# Patient Record
Sex: MALE | Race: WHITE | NOT HISPANIC OR LATINO | URBAN - METROPOLITAN AREA
[De-identification: names, ages, dates, MRNs, and addresses within clinical notes are randomized per-mention and may not be internally consistent; named-entity substitution may affect disease eponyms.]

---

## 2019-01-16 ENCOUNTER — HISTORICAL (OUTPATIENT)
Dept: ADMINISTRATIVE | Facility: HOSPITAL | Age: 69
End: 2019-01-16

## 2019-01-16 LAB
ALBUMIN SERPL-MCNC: 4.1 GM/DL (ref 3.4–5)
ALP SERPL-CCNC: 85 UNIT/L (ref 50–136)
ALT SERPL-CCNC: 24 UNIT/L (ref 12–78)
AST SERPL-CCNC: 15 UNIT/L (ref 15–37)
BILIRUB SERPL-MCNC: 0.4 MG/DL (ref 0.2–1)
BILIRUBIN DIRECT+TOT PNL SERPL-MCNC: 0.1 MG/DL (ref 0–0.5)
BILIRUBIN DIRECT+TOT PNL SERPL-MCNC: 0.3 MG/DL (ref 0–0.8)
CHOLEST SERPL-MCNC: 202 MG/DL (ref 0–200)
CHOLEST/HDLC SERPL: 4 {RATIO} (ref 0–5)
HDLC SERPL-MCNC: 51 MG/DL (ref 35–60)
LDLC SERPL CALC-MCNC: 136 MG/DL (ref 0–129)
LIVER PROFILE INTERP: NORMAL
PROT SERPL-MCNC: 6.9 GM/DL (ref 6.4–8.2)
PSA SERPL-MCNC: 0.9 NG/ML (ref 0–4)
TRIGL SERPL-MCNC: 76 MG/DL (ref 30–150)
VLDLC SERPL CALC-MCNC: 15 MG/DL

## 2019-04-29 ENCOUNTER — HISTORICAL (OUTPATIENT)
Dept: ADMINISTRATIVE | Facility: HOSPITAL | Age: 69
End: 2019-04-29

## 2019-05-09 ENCOUNTER — HISTORICAL (OUTPATIENT)
Dept: ADMINISTRATIVE | Facility: HOSPITAL | Age: 69
End: 2019-05-09

## 2019-05-09 ENCOUNTER — HISTORICAL (OUTPATIENT)
Dept: LAB | Facility: HOSPITAL | Age: 69
End: 2019-05-09

## 2019-05-10 LAB — GRAM STN SPEC: NORMAL

## 2019-05-12 LAB — FINAL CULTURE: NO GROWTH

## 2019-07-02 ENCOUNTER — HISTORICAL (OUTPATIENT)
Dept: LAB | Facility: HOSPITAL | Age: 69
End: 2019-07-02

## 2019-07-03 LAB — GRAM STN SPEC: NORMAL

## 2019-07-04 LAB — FINAL CULTURE: NORMAL

## 2019-09-24 ENCOUNTER — HISTORICAL (OUTPATIENT)
Dept: ADMINISTRATIVE | Facility: HOSPITAL | Age: 69
End: 2019-09-24

## 2019-09-24 LAB
ALBUMIN SERPL-MCNC: 4.1 GM/DL (ref 3.4–5)
ALP SERPL-CCNC: 72 UNIT/L (ref 50–136)
ALT SERPL-CCNC: 36 UNIT/L (ref 12–78)
AST SERPL-CCNC: 21 UNIT/L (ref 15–37)
BILIRUB SERPL-MCNC: 0.6 MG/DL (ref 0.2–1)
BILIRUBIN DIRECT+TOT PNL SERPL-MCNC: 0.1 MG/DL (ref 0–0.5)
BILIRUBIN DIRECT+TOT PNL SERPL-MCNC: 0.5 MG/DL (ref 0–0.8)
CHOLEST SERPL-MCNC: 210 MG/DL (ref 0–200)
CHOLEST/HDLC SERPL: 3.9 {RATIO} (ref 0–5)
HDLC SERPL-MCNC: 54 MG/DL (ref 35–60)
LDLC SERPL CALC-MCNC: 141 MG/DL (ref 0–129)
LIVER PROFILE INTERP: NORMAL
PROT SERPL-MCNC: 6.6 GM/DL (ref 6.4–8.2)
TRIGL SERPL-MCNC: 73 MG/DL (ref 30–150)
VLDLC SERPL CALC-MCNC: 15 MG/DL

## 2019-09-26 ENCOUNTER — HISTORICAL (OUTPATIENT)
Dept: ADMINISTRATIVE | Facility: HOSPITAL | Age: 69
End: 2019-09-26

## 2019-09-26 LAB
ABS NEUT (OLG): 4.81 X10(3)/MCL (ref 2.1–9.2)
BASOPHILS # BLD AUTO: 0 X10(3)/MCL (ref 0–0.2)
BASOPHILS NFR BLD AUTO: 0 %
EOSINOPHIL # BLD AUTO: 0 X10(3)/MCL (ref 0–0.9)
EOSINOPHIL NFR BLD AUTO: 0 %
ERYTHROCYTE [DISTWIDTH] IN BLOOD BY AUTOMATED COUNT: 12 % (ref 11.5–17)
HCT VFR BLD AUTO: 47.4 % (ref 42–52)
HGB BLD-MCNC: 15.5 GM/DL (ref 14–18)
LYMPHOCYTES # BLD AUTO: 0.6 X10(3)/MCL (ref 0.6–4.6)
LYMPHOCYTES NFR BLD AUTO: 11 %
MCH RBC QN AUTO: 28.3 PG (ref 27–31)
MCHC RBC AUTO-ENTMCNC: 32.7 GM/DL (ref 33–36)
MCV RBC AUTO: 86.7 FL (ref 80–94)
MONOCYTES # BLD AUTO: 0.1 X10(3)/MCL (ref 0.1–1.3)
MONOCYTES NFR BLD AUTO: 1 %
NEUTROPHILS # BLD AUTO: 4.81 X10(3)/MCL (ref 2.1–9.2)
NEUTROPHILS NFR BLD AUTO: 87 %
PLATELET # BLD AUTO: 186 X10(3)/MCL (ref 130–400)
PMV BLD AUTO: 10.2 FL (ref 9.4–12.4)
RBC # BLD AUTO: 5.47 X10(6)/MCL (ref 4.7–6.1)
WBC # SPEC AUTO: 5.5 X10(3)/MCL (ref 4.5–11.5)

## 2019-10-09 ENCOUNTER — HISTORICAL (OUTPATIENT)
Dept: LAB | Facility: HOSPITAL | Age: 69
End: 2019-10-09

## 2019-10-09 ENCOUNTER — HISTORICAL (OUTPATIENT)
Dept: ADMINISTRATIVE | Facility: HOSPITAL | Age: 69
End: 2019-10-09

## 2019-10-09 LAB
ABS NEUT (OLG): 4.8 X10(3)/MCL (ref 2.1–9.2)
ALBUMIN SERPL-MCNC: 4.2 GM/DL (ref 3.4–5)
ALBUMIN/GLOB SERPL: 1.91 {RATIO} (ref 1.5–2.5)
ALP SERPL-CCNC: 60 UNIT/L (ref 38–126)
ALT SERPL-CCNC: 16 UNIT/L (ref 7–52)
APPEARANCE, UA: CLEAR
AST SERPL-CCNC: 16 UNIT/L (ref 15–37)
BACTERIA #/AREA URNS AUTO: NORMAL /HPF
BILIRUB SERPL-MCNC: 0.4 MG/DL (ref 0.2–1)
BILIRUB UR QL STRIP: NEGATIVE MG/DL
BILIRUBIN DIRECT+TOT PNL SERPL-MCNC: 0.1 MG/DL (ref 0–0.5)
BILIRUBIN DIRECT+TOT PNL SERPL-MCNC: 0.3 MG/DL
BUN SERPL-MCNC: 19 MG/DL (ref 7–18)
CALCIUM SERPL-MCNC: 9 MG/DL (ref 8.5–10)
CHLORIDE SERPL-SCNC: 108 MMOL/L (ref 98–107)
CHOLEST SERPL-MCNC: 230 MG/DL (ref 0–200)
CHOLEST/HDLC SERPL: 5.1 {RATIO}
CO2 SERPL-SCNC: 27 MMOL/L (ref 21–32)
COLOR UR: YELLOW
CREAT SERPL-MCNC: 1.09 MG/DL (ref 0.6–1.3)
ERYTHROCYTE [DISTWIDTH] IN BLOOD BY AUTOMATED COUNT: 12 % (ref 11.5–17)
EST. AVERAGE GLUCOSE BLD GHB EST-MCNC: 100 MG/DL
GLOBULIN SER-MCNC: 2.2 GM/DL (ref 1.2–3)
GLUCOSE (UA): NEGATIVE MG/DL
GLUCOSE SERPL-MCNC: 118 MG/DL (ref 74–106)
HBA1C MFR BLD: 5.1 % (ref 4.4–6.4)
HCT VFR BLD AUTO: 43.7 % (ref 42–52)
HCV AB SERPL QL IA: NEGATIVE
HDLC SERPL-MCNC: 45 MG/DL (ref 35–60)
HGB BLD-MCNC: 15.1 GM/DL (ref 14–18)
HGB UR QL STRIP: NEGATIVE UNIT/L
KETONES UR QL STRIP: NEGATIVE MG/DL
LDLC SERPL CALC-MCNC: 161 MG/DL (ref 0–129)
LEUKOCYTE ESTERASE UR QL STRIP: NEGATIVE UNIT/L
LYMPHOCYTES # BLD AUTO: 2 X10(3)/MCL (ref 0.6–3.4)
LYMPHOCYTES NFR BLD AUTO: 26.9 % (ref 13–40)
MCH RBC QN AUTO: 29.2 PG (ref 27–31.2)
MCHC RBC AUTO-ENTMCNC: 35 GM/DL (ref 32–36)
MCV RBC AUTO: 84 FL (ref 80–94)
MONOCYTES # BLD AUTO: 0.5 X10(3)/MCL (ref 0.1–1.3)
MONOCYTES NFR BLD AUTO: 6.6 % (ref 0.1–24)
NEUTROPHILS NFR BLD AUTO: 66.5 % (ref 47–80)
NITRITE UR QL STRIP.AUTO: NEGATIVE
PH UR STRIP: 5.5 [PH]
PLATELET # BLD AUTO: 150 X10(3)/MCL (ref 130–400)
PMV BLD AUTO: 9.7 FL (ref 9.4–12.4)
POTASSIUM SERPL-SCNC: 4.3 MMOL/L (ref 3.5–5.1)
PROT SERPL-MCNC: 6.4 GM/DL (ref 6.4–8.2)
PROT UR QL STRIP: NEGATIVE MG/DL
PSA SERPL-MCNC: 0.92 NG/ML (ref 0–4.5)
RBC # BLD AUTO: 5.18 X10(6)/MCL (ref 4.7–6.1)
RBC #/AREA URNS HPF: NORMAL /HPF
SODIUM SERPL-SCNC: 141 MMOL/L (ref 136–145)
SP GR UR STRIP: 1.01
SQUAMOUS EPITHELIAL, UA: NORMAL /LPF
TRIGL SERPL-MCNC: 65 MG/DL (ref 30–150)
TSH SERPL-ACNC: 1.3 MIU/ML (ref 0.35–4.94)
UROBILINOGEN UR STRIP-ACNC: 0.2 MG/DL
VLDLC SERPL CALC-MCNC: 13 MG/DL
WBC # SPEC AUTO: 7.3 X10(3)/MCL (ref 4.5–11.5)
WBC #/AREA URNS AUTO: NORMAL /[HPF]

## 2019-11-15 ENCOUNTER — HISTORICAL (OUTPATIENT)
Dept: ADMINISTRATIVE | Facility: HOSPITAL | Age: 69
End: 2019-11-15

## 2020-10-20 ENCOUNTER — HISTORICAL (OUTPATIENT)
Dept: ADMINISTRATIVE | Facility: HOSPITAL | Age: 70
End: 2020-10-20

## 2020-10-20 LAB
CHOLEST SERPL-MCNC: 195 MG/DL
CHOLEST/HDLC SERPL: 4 {RATIO} (ref 0–5)
HDLC SERPL-MCNC: 46 MG/DL (ref 35–60)
LDLC SERPL CALC-MCNC: 134 MG/DL (ref 50–140)
TRIGL SERPL-MCNC: 76 MG/DL (ref 34–140)
VLDLC SERPL CALC-MCNC: 15 MG/DL

## 2021-02-17 ENCOUNTER — HISTORICAL (OUTPATIENT)
Dept: ADMINISTRATIVE | Facility: HOSPITAL | Age: 71
End: 2021-02-17

## 2021-05-05 ENCOUNTER — HISTORICAL (OUTPATIENT)
Dept: ADMINISTRATIVE | Facility: HOSPITAL | Age: 71
End: 2021-05-05

## 2021-05-05 LAB
CHOLEST SERPL-MCNC: 199 MG/DL
CHOLEST/HDLC SERPL: 5 {RATIO} (ref 0–5)
HDLC SERPL-MCNC: 40 MG/DL (ref 35–60)
LDLC SERPL CALC-MCNC: 136 MG/DL (ref 50–140)
TRIGL SERPL-MCNC: 114 MG/DL (ref 34–140)
VLDLC SERPL CALC-MCNC: 23 MG/DL

## 2021-12-27 PROBLEM — I05.9 MITRAL VALVE DISEASE: Status: ACTIVE | Noted: 2021-12-27

## 2021-12-27 PROBLEM — Z95.2 H/O AORTIC VALVE REPLACEMENT: Status: ACTIVE | Noted: 2021-12-27

## 2021-12-27 PROBLEM — U07.1 COVID-19: Status: ACTIVE | Noted: 2021-12-27

## 2021-12-28 ENCOUNTER — INFUSION (OUTPATIENT)
Dept: INFECTIOUS DISEASES | Facility: HOSPITAL | Age: 71
End: 2021-12-28
Payer: OTHER GOVERNMENT

## 2021-12-28 VITALS
TEMPERATURE: 98 F | WEIGHT: 170 LBS | SYSTOLIC BLOOD PRESSURE: 154 MMHG | OXYGEN SATURATION: 96 % | HEART RATE: 59 BPM | BODY MASS INDEX: 25.18 KG/M2 | DIASTOLIC BLOOD PRESSURE: 83 MMHG | HEIGHT: 69 IN | RESPIRATION RATE: 20 BRPM

## 2021-12-28 DIAGNOSIS — U07.1 COVID-19: Primary | ICD-10-CM

## 2021-12-28 PROCEDURE — 25000003 PHARM REV CODE 250: Mod: PN | Performed by: ANESTHESIOLOGY

## 2021-12-28 PROCEDURE — 63600175 PHARM REV CODE 636 W HCPCS: Mod: PN | Performed by: ANESTHESIOLOGY

## 2021-12-28 PROCEDURE — M0243 CASIRIVI AND IMDEVI INFUSION: HCPCS | Performed by: ANESTHESIOLOGY

## 2021-12-28 RX ORDER — ONDANSETRON 4 MG/1
4 TABLET, ORALLY DISINTEGRATING ORAL ONCE AS NEEDED
Status: DISCONTINUED | OUTPATIENT
Start: 2021-12-28 | End: 2024-01-08

## 2021-12-28 RX ORDER — METHYLPREDNISOLONE SOD SUCC 125 MG
40 VIAL (EA) INJECTION ONCE AS NEEDED
Status: DISCONTINUED | OUTPATIENT
Start: 2021-12-28 | End: 2022-12-20

## 2021-12-28 RX ORDER — SODIUM CHLORIDE 0.9 % (FLUSH) 0.9 %
10 SYRINGE (ML) INJECTION
Status: DISCONTINUED | OUTPATIENT
Start: 2021-12-28 | End: 2022-12-20

## 2021-12-28 RX ORDER — ALBUTEROL SULFATE 90 UG/1
2 AEROSOL, METERED RESPIRATORY (INHALATION)
Status: DISCONTINUED | OUTPATIENT
Start: 2021-12-28 | End: 2022-12-20

## 2021-12-28 RX ORDER — ACETAMINOPHEN 325 MG/1
650 TABLET ORAL ONCE AS NEEDED
Status: DISCONTINUED | OUTPATIENT
Start: 2021-12-28 | End: 2022-07-26

## 2021-12-28 RX ORDER — DIPHENHYDRAMINE HYDROCHLORIDE 50 MG/ML
25 INJECTION INTRAMUSCULAR; INTRAVENOUS ONCE AS NEEDED
Status: DISCONTINUED | OUTPATIENT
Start: 2021-12-28 | End: 2024-01-08

## 2021-12-28 RX ORDER — EPINEPHRINE 0.3 MG/.3ML
0.3 INJECTION SUBCUTANEOUS
Status: DISCONTINUED | OUTPATIENT
Start: 2021-12-28 | End: 2024-01-08

## 2021-12-28 RX ADMIN — SODIUM CHLORIDE: 0.9 INJECTION, SOLUTION INTRAVENOUS at 02:12

## 2021-12-28 RX ADMIN — Medication 600 MG: at 02:12

## 2022-04-10 ENCOUNTER — HISTORICAL (OUTPATIENT)
Dept: ADMINISTRATIVE | Facility: HOSPITAL | Age: 72
End: 2022-04-10

## 2022-04-25 VITALS
DIASTOLIC BLOOD PRESSURE: 85 MMHG | WEIGHT: 170.63 LBS | HEIGHT: 69 IN | OXYGEN SATURATION: 98 % | BODY MASS INDEX: 25.27 KG/M2 | SYSTOLIC BLOOD PRESSURE: 135 MMHG

## 2022-05-03 NOTE — HISTORICAL [facility] CERNER
This is a historical note converted from [employer]. Formatting and pictures may have been removed.  Please reference Rusty for original formatting and attached multimedia. Chief Complaint  wellness  History of Present Illness  The patient is a 69 year old white male here today for a complete Wellness physical.? The patient?has?no acute complaints today. The patient also carries a diagnosis of?aortic valve stenosis with history of repair/vasomotor rhinitis, which is assessed today.? Exercise is reported as moderate and includes?primarily work activities but does have?several times a week?full body workout with no chest pain or shortness of breath reported.?? Diet modifications are reported as?attempting lean proteins and increased vegetable matter. ?Decrease carbohydrates. ?Although he does report some?snacking on sweets?secondary to family dynamic.?? Previous documented weight is recorded? as charted?with noted good maintenance of weight with no?dramatic change.  He does follow closely with Dr. Chandler his cardiologist for?aortic valve stenosis status post repair. ?Recently he was found to have mitral valve regurgitation which Dr. Chandler is following closely. ?Asymptomatic.  He follows with Dr. Fede Jimenez for?alopecia/Propecia prescription.? He most recently was being seen for?sinusitis, bronchitis symptoms?for which he saw myself, Matthew Molina, and eventually?Dr. Ye?with allergy and immunology-allergy work-up was negative and he was diagnosed with?vasomotor ?rhinitis. ?He was placed on Dymista and Xyzal?and symptoms have been?improving  ?   Dr. Chandler-cardiology  Dr. Fede Jimenez-alopecia/Propecia prescription  Dr.?Frankie Stahl-dermatology  Review of Systems  Constitutional:?no weight gain,?no weight loss,?no fatigue,?no fever,?no chills,?no weakness,?no trouble sleeping.  Eyes:?no vision loss/changes,?no glasses or contacts,?no pain,?no redness,?no blurry or double vision,?no flashing lights,?no  specks,?no glaucoma,?no cataracts.  Last eye exam:?within last 6 months  Head:?no headache,?no head injury,?no neck pain.?  Neck:??no lumps,?no swollen glands,?no stiffness.  Ears:?no decreased hearing,?no ringing,?no earache,?no drainage.?  Nose:?no stuffiness,?no discharge,?no itching,?no hay fever,?no nosebleeds,?no sinus pain.  Throat:?no bleeding,?no dentures,?no sore tongue,?no dry mouth,?no sore throat,?no hoarseness,?no thrush,?no non-healing sores.  Cardiovascular:?no chest pain or discomfort,?no tightness,?no palpitations,?no SOB with activity,?no difficulty breathing while supine,?no swelling,?no sudden awakening from sleep with SOB.  Vascular:?no calf pain with walking,?no leg cramping.  Respiratory:??no cough,?no sputum,?no coughing up blood,?no SOB,?no wheezing,?no painful breathing.  Gastrointestinal:?no swallowing difficulty,?no heartburn,?no change in appetite,?no nausea,?no change in bowel habits,?no rectal bleeding,?no constipation,?no diarrhea,?no yellow eyes or skin.  Urinary:?no frequency,?no urgency,?no burning or pain,?no blood in urine,?no incontinence,?no change in urinary strength.  Musculoskeletal:?no muscle or joint pain,?no stiffness,?no back pain,?no redness of joints,?no swelling of joints,?no trauma.  Skin:?no rashes,?no lumps,?no itching,?no dryness,?color normal for ethnicity,?no hair or nail changes.  Neurologic:?no dizziness,?no fainting,?no seizures,?no weakness,?no numbness,?no tingling,?no tremors.  Psychiatric:?no nervousness,?no stress,?no depression,?no memory loss.  Endocrine:?no heat or cold intolerance,?no sweating,?no frequent urination,?no thirst,?no change in appetite.  Hematologic:?no ease of bruising,?no ease of bleeding.  ?  ?  ?  ?  Physical Exam  Vitals & Measurements  BP:?144/76?  HT:?175?cm? WT:?78.8?kg? BMI:?25.73?  VITAL SIGNS:? Reviewed.? ?  GENERAL:? In?no apparent distress.? Alert and Oriented x3  HEAD:?No signsof head trauma. Normocephalic  EYES:?  Pupils?equal/round/reactive.? Extraocular motionsintact.  EARS:? Hearing?grossly intact. TMs and EAC?clear  MOUTH:??Oropharynx is clear.?No erythema. No exudates  NECK:? No LAD. No JVD. No thyromegaly. No bruits  CHEST:? Chest with clear breath sounds bilaterally.? No wheezes, rales, or rhonchi. Good air movement  CARDIAC:? Regular rate and rhythm.? S1 and S2, noted?3 out of 6?murmur heard near mitral valve, gallops, or rubs.  VASCULAR:??No Edema.? Peripheral pulses normal and equal in all extremities.  ABDOMEN:?Soft, without detectable tenderness.??No sign of distention.?No rebound or guarding, and no masses palpated.? ?Bowel Sounds present and normal x 4.  MUSCULOSKELETAL:??Good range of motion of all major joints.?5/5 strength throughout. Extremities without clubbing, cyanosis or edema.  NEUROLOGIC EXAM:? Alert and oriented x 3.? No focal sensory or strength deficits.? ?Speech normal.? Follows commands.  PSYCHIATRIC:? Mood normal.  SKIN:??No rash or lesions.  Assessment/Plan  1.?Wellness examination?Z00.00  ?Assessment/Plan:  ?   1.?Wellness  ?   -Health and Exercise Prescription issued and educated upon  ?   -10% weight loss goal  ?   -Lifestyle counseling >20minutes  ?   -Diet: Lean proteins and increased vegetable matter, low-sodium, cardiac  ?   -Screening: Colonoscopy deferred by patient; after discussion he agrees to Cologuard-we will fill out paperwork and he will perform.? Prostate screening-she defers FRANNIE today-risk and benefits discussed and the patient voiced understanding of guidelines-PSA drawn today?and found to be normal,?nonchanging?relative to last.  ?   -Vaccines: The patient will perform Shingrix No. 1,?Td vaccine,?HD flu vaccine upon his return?from hunting in 2 weeks. ?Otherwise up-to-date  ?   -Labs:?See below  ?   2.Comorbidities:?See below  ?  3. Referrals:?None  ?  4. RTC: 12-month wellness  ?  Ordered:  Clinic Follow up, *Est. 10/14/20 8:45:00 CDT, Order for future visit, Wellness  examination, [facility]  Preventative Health Care Est 65 yrs and over [id number] PC, Wellness examination, [facility], 10/09/19 15:35:00 CDT  ?  2.?History of aortic valve stenosis?Z86.79  ?-Follow-up with Dr. Chandler accordingly as scheduled or sooner if clinically indicated  -Asymptomatic today  ?  -Defers vaccinations today but will return in 2 weeks for these  Referrals  Clinic Follow up, *Est. 10/14/20 8:45:00 CDT, Order for future visit, Wellness examination, [facility]   Problem List/Past Medical History  Ongoing  History of aortic valve stenosis  Historical  No qualifying data  Medications  aspirin 81 mg oral tablet, CHEWABLE, Daily  Avapro 150 mg oral tablet, Daily  finasteride 1 mg oral tablet, Daily  Multi Vitamin+  Allergies  No Known Allergies  Social History  Abuse/Neglect  No, 10/09/2019  Tobacco  Never (less than 100 in lifetime), N/A, 10/09/2019  Health Maintenance  Health Maintenance  ???Pending?(in the next year)  ??? ??OverDue  ??? ? ? ?Advance Directive due??01/01/19??and every 1??year(s)  ??? ? ? ?Cognitive Screening due??01/01/19??and every 1??year(s)  ??? ? ? ?Fall Risk Assessment due??01/01/19??and every 1??year(s)  ??? ? ? ?Functional Assessment due??01/01/19??and every 1??year(s)  ??? ? ? ?Geriatric Depression Screening due??01/01/19??and every 1??year(s)  ??? ??Due?  ??? ? ? ?ADL Screening due??10/10/19??and every 1??year(s)  ??? ? ? ?Colorectal Screening (Sen[facility] Wellness) due??10/10/19??and every?  ??? ? ? ?Pneumococcal Vaccine due??10/10/19??Variable frequency  ??? ? ? ?Pneumococcal Vaccine due??10/10/19??and every?  ??? ? ? ?Tetanus Vaccine due??10/10/19??and every 10??year(s)  ??? ? ? ?Zoster Vaccine due??10/10/19??and every 100??year(s)  ??? ??Due In Future?  ??? ? ? ?Alcohol Misuse Screening not due until??01/01/20??and every 1??year(s)  ??? ? ? ?Obesity Screening not due until??01/01/20??and every 1??year(s)  ???Satisfied?(in the past 1 year)  ??? ??Satisfied?  ??? ? ? ?Alcohol Misuse  Screening on??10/10/19.??Satisfied by Gulshan Hollis MD  ??? ? ? ?Aspirin Therapy for CVD Prevention on??10/10/19.??Satisfied by Gulshan Hollis MD  ??? ? ? ?Blood Pressure Screening on??10/09/19.??Satisfied by Emily Knutson MA  ??? ? ? ?Body Mass Index Check on??10/09/19.??Satisfied by Emily Knutson MA  ??? ? ? ?Diabetes Screening on??10/09/19.??Satisfied by Antonia Chaparro  ??? ? ? ?Influenza Vaccine on??10/09/19.??Satisfied by Emily Knutson MA  ??? ? ? ?Lipid Screening on??10/09/19.??Satisfied by Elissa Cornejo  ??? ? ? ?Obesity Screening on??10/09/19.??Satisfied by Emily Knutson MA  ?      Addendum:?Note was produced on 10-10-19??Actual office visit was performed on 10-9-2019 for wellness visit

## 2022-06-01 PROBLEM — M79.675 GREAT TOE PAIN, LEFT: Status: ACTIVE | Noted: 2022-06-01

## 2022-06-01 PROBLEM — M10.9 GOUT: Status: ACTIVE | Noted: 2022-06-01

## 2022-06-01 PROBLEM — Z86.79 H/O HEART DISORDER: Status: ACTIVE | Noted: 2022-06-01

## 2022-06-01 PROBLEM — E04.1 THYROID NODULE: Status: ACTIVE | Noted: 2022-06-01

## 2022-06-01 PROBLEM — E78.5 HYPERLIPIDEMIA: Status: ACTIVE | Noted: 2022-06-01

## 2022-12-20 PROBLEM — M54.16 LUMBAR BACK PAIN WITH RADICULOPATHY AFFECTING LOWER EXTREMITY: Status: ACTIVE | Noted: 2022-12-20

## 2022-12-20 PROBLEM — M51.26 LUMBAR DISC HERNIATION: Status: ACTIVE | Noted: 2022-12-20

## 2024-01-08 PROBLEM — U07.1 COVID-19: Status: RESOLVED | Noted: 2021-12-27 | Resolved: 2024-01-08

## 2024-01-08 PROBLEM — L64.9 MALE PATTERN ALOPECIA: Status: ACTIVE | Noted: 2024-01-08

## 2024-01-08 PROBLEM — E78.2 MIXED HYPERLIPIDEMIA: Status: ACTIVE | Noted: 2022-06-01

## 2024-01-08 PROBLEM — N52.03 COMBINED ARTERIAL INSUFFICIENCY AND CORPORO-VENOUS OCCLUSIVE ERECTILE DYSFUNCTION: Status: ACTIVE | Noted: 2024-01-08

## 2024-01-08 PROBLEM — I10 HTN (HYPERTENSION), BENIGN: Status: ACTIVE | Noted: 2024-01-08
